# Patient Record
(demographics unavailable — no encounter records)

---

## 2025-02-14 NOTE — REVIEW OF SYSTEMS
[Limping] : limping [Nl] : Musculoskeletal [No Acute Changes] : No acute changes since previous visit

## 2025-02-18 NOTE — DATA REVIEWED
[de-identified] : XR pelvis AP and lateral performed today 2/14/25: There is flattening of right femoral head noted when compared to prior imaging. 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

## 2025-02-18 NOTE — DATA REVIEWED
[de-identified] : XR pelvis AP and lateral performed today 2/14/25: There is flattening of right femoral head noted when compared to prior imaging. 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

## 2025-02-18 NOTE — PHYSICAL EXAM
[FreeTextEntry1] : Gait: Presents ambulating independently with subtle limp  GENERAL: alert, cooperative, in NAD SKIN: The skin is intact, warm, pink and dry over the area examined. EYES: Normal conjunctiva, normal eyelids and pupils were equal and round. ENT: normal ears, normal nose and normal lips. CARDIOVASCULAR: brisk capillary refill, but no peripheral edema. RESPIRATORY: The patient is in no apparent respiratory distress. They're taking full deep breaths without use of accessory muscles or evidence of audible wheezes or stridor without the use of a stethoscope. Normal respiratory effort. ABDOMEN: not examined  Right Hip: Lateral hip incision is well healed. No evidence of dehiscence, erythema or concerns for infection. No palpable prominence of screw heads or TTP over greater trochanter. Moderate adductor tightness noticed with gentle hip ROM. Decreased strength globally about RLE. Negative log roll. Sensation intact to light touch. Patellar reflex +2 B/L. No palpable joint laxity. No clinical leg length discrepancy noted.
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
## 2025-02-18 NOTE — END OF VISIT
[Time Spent: ___ minutes] : I have spent [unfilled] minutes of time on the encounter which excludes teaching and separately reported services. [FreeTextEntry3] :     Saw and examined patient; the above is an accurate documentation of my words and actions.   Adela Merlos MD [facility] Pediatric Orthopedic Surgery
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
## 2025-02-18 NOTE — END OF VISIT
[Time Spent: ___ minutes] : I have spent [unfilled] minutes of time on the encounter which excludes teaching and separately reported services. [FreeTextEntry3] :     Saw and examined patient; the above is an accurate documentation of my words and actions.   Adela Merlos MD [facility] Pediatric Orthopedic Surgery

## 2025-03-18 NOTE — DATA REVIEWED
[de-identified] : CT scan was obtained on 02/28/2025 and independently reviewed today: 1. 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 2. Moderate right hip arthrosis with effusion. 3. Advanced healing of Salter-Garcia type II right femoral neck fracture.  XR pelvis AP and lateral performed today 2/14/25: There is flattening of right femoral head noted when compared to prior imaging. 

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
## 2025-03-18 NOTE — END OF VISIT
[FreeTextEntry3] : A physician assistant/resident assisted with documenting the visit and acted as a scribe. I have seen and examined the patient, made my assessment and plan and have made all modifications necessary to the note.  Izabel Ríos MD Pediatric Orthopaedics Surgery [facility]  [Time Spent: ___ minutes] : I have spent [unfilled] minutes of time on the encounter which excludes teaching and separately reported services.

## 2025-03-18 NOTE — PHYSICAL EXAM
[FreeTextEntry1] : Gait: Presents ambulating independently with subtle limp. GENERAL: alert, cooperative, in NAD SKIN: The skin is intact, warm, pink and dry over the area examined. EYES: Normal conjunctiva, normal eyelids and pupils were equal and round. ENT: normal ears, normal nose and normal lips. CARDIOVASCULAR: brisk capillary refill, but no peripheral edema. RESPIRATORY: The patient is in no apparent respiratory distress. They're taking full deep breaths without use of accessory muscles or evidence of audible wheezes or stridor without the use of a stethoscope. Normal respiratory effort. ABDOMEN: not examined  Right Hip: Lateral hip incision is well healed. No evidence of dehiscence, erythema or concerns for infection. No palpable prominence of screw heads or TTP over greater trochanter. Moderate adductor tightness noticed with gentle hip ROM. Decreased strength globally about RLE. Negative log roll. 95 degrees flexion noted. IR10 degrees on R and 40 degrees ER. IR 30 degree on R and 75 degrees on left. Sensation intact to light touch. Patellar reflex +2 B/L. No palpable joint laxity. No clinical leg length discrepancy noted.

## 2025-03-18 NOTE — DATA REVIEWED
[de-identified] : CT scan was obtained on 02/28/2025 and independently reviewed today: 1. 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 2. Moderate right hip arthrosis with effusion. 3. Advanced healing of Salter-Garcia type II right femoral neck fracture.  XR pelvis AP and lateral performed today 2/14/25: There is flattening of right femoral head noted when compared to prior imaging. 

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
## 2025-04-15 NOTE — END OF VISIT
[FreeTextEntry3] : A physician assistant/resident assisted with documenting the visit and acted as a scribe. I have seen and examined the patient, made my assessment and plan and have made all modifications necessary to the note.  Izabel Ríos MD Pediatric Orthopaedics Surgery [facility]
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
## 2025-04-15 NOTE — END OF VISIT
[FreeTextEntry3] : A physician assistant/resident assisted with documenting the visit and acted as a scribe. I have seen and examined the patient, made my assessment and plan and have made all modifications necessary to the note.  Izabel Ríos MD Pediatric Orthopaedics Surgery [facility]
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
## 2025-07-21 NOTE — END OF VISIT
[FreeTextEntry3] : A physician assistant/resident assisted with documenting the visit and acted as a scribe. I have seen and examined the patient, made my assessment and plan and have made all modifications necessary to the note.  Izabel Ríos MD Pediatric Orthopaedics Surgery [facility]
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
